# Patient Record
Sex: MALE | Race: WHITE
[De-identification: names, ages, dates, MRNs, and addresses within clinical notes are randomized per-mention and may not be internally consistent; named-entity substitution may affect disease eponyms.]

---

## 2020-04-02 ENCOUNTER — HOSPITAL ENCOUNTER (EMERGENCY)
Dept: HOSPITAL 56 - MW.ED | Age: 25
Discharge: HOME | End: 2020-04-02
Payer: SELF-PAY

## 2020-04-02 DIAGNOSIS — S05.01XA: Primary | ICD-10-CM

## 2020-04-02 DIAGNOSIS — H10.31: ICD-10-CM
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
## 2020-04-02 PROCEDURE — 99283 EMERGENCY DEPT VISIT LOW MDM: CPT

## 2020-04-02 NOTE — EDM.PDOC
ED HPI GENERAL MEDICAL PROBLEM





- General


Chief Complaint: Eye Problems


Stated Complaint: IRRITATION IN EYE


Time Seen by Provider: 04/02/20 10:04


Source of Information: Reports: Patient


History Limitations: Reports: No Limitations





- History of Present Illness


INITIAL COMMENTS - FREE TEXT/NARRATIVE: 


HISTORY AND PHYSICAL:





History of present illness:


Patient is a 24-year-old male who presents to the emergency room with 

complaints of irritation of his right thigh.  He states he noticed some 

irritation on Friday which has progressively gotten worse.  He states over the 

past 2 days he has had some drainage and crusty discharge from the eye.  He has 

tried some over-the-counter Benadryl which he reports helped with some of the 

swelling around the eye but returned the next day.  He denies any contact lens 

usage.  He denies any visual change or ocular pain with movement.  He denies 

any possible exposure to foreign body such as welding debris.  Patient denies 

any fever, chills, headache, change in vision, syncope or near syncope. Denies 

any chest pain, back pain, shortness of breath or cough. Denies any GI or  

symptoms.





Review of systems: 


As per history of present illness and below otherwise all systems reviewed and 

negative.





Past medical history: 


As per history of present illness and as reviewed below otherwise 

noncontributory.





Surgical history: 


As per history of present illness and as reviewed below otherwise 

noncontributory.





Social history: 


See social history for further information





Family history: 


As per history of present illness and as reviewed below otherwise 

noncontributory.





Physical exam:


General: Well-developed and well-nourished 24-year-old male.  Alert and 

oriented.  Nontoxic-appearing and in no acute distress.


HEENT: Atraumatic, normocephalic, pupils equal and reactive bilaterally, 

negative for conjunctival pallor or scleral icterus, sclera injection of the 

right, pain crusty drainage noted along the lower lash line of the right, 

mucous membranes moist, TMs normal bilaterally, throat clear, neck supple, 

nontender, trachea midline. No drooling or trismus noted. No meningeal signs. 

No hot potato voice noted. 


Lungs: Clear to auscultation, breath sounds equal bilaterally.


Heart: S1S2, regular rate and rhythm without overt murmur


Abdomen: Soft, nondistended, nontender. 


Skin: Intact, warm, dry. No lesions or rashes noted.


Extremities: Atraumatic, moves all extremities per self without difficulty or 

deficits. Neurovascular unremarkable.


Neuro: Awake, alert, oriented. Cranial nerves II through XII unremarkable. 

Cerebellum unremarkable. Motor and sensory unremarkable throughout. Exam 

nonfocal.





Notes:


Visual acuity is within normal limits.  Tetracaine and fluorescein eye exam was 

completed.  He does have a corneal abrasion at the 8 to 10 o'clock position on 

the sclera.  No foreign body noted.  Erythromycin ointment was provided with 

education while here.  We discussed the need to follow-up with ophthalmology if 

symptoms do not improve or worsen. Medication and supportive care measures were 

reviewed and discussed. Voices understanding and is agreeable to plan of care. 

Denies any further questions or concerns at this time.





Diagnostics:


Eye exam





Therapeutics:


Tetracaine, Erythromycin ointment





Prescription:


Erythromycin ointment





Impression: 


Conjunctivitis, right


Corneal Abrasion, right





Plan:


1. Good handwashing; avoid rubbing your eyes or touching the eye dropper in the 

eye.


2. Apply the ointment 5 x daily or every 4 hours while awake.


3. As we discussed, follow up with ophthalmology.


4. Return to the ED as needed as discussed. 





Definitive disposition and diagnosis as appropriate pending reevaluation and 

review of above.





  ** Right Eye


Pain Score (Numeric/FACES): 6





- Related Data


 Allergies











Allergy/AdvReac Type Severity Reaction Status Date / Time


 


amoxicillin Allergy  Swelling Verified 04/02/20 10:13


 


Penicillins Allergy  Swelling Verified 04/02/20 10:13











Home Meds: 


 Home Meds





Erythromycin Base [Erythromycin 0.5% Ophth Oint] 1 applic OP Q4H #1 tube 04/02/ 20 [Rx]











ED ROS GENERAL





- Review of Systems


Review Of Systems: Comprehensive ROS is negative, except as noted in HPI.





ED EXAM GENERAL W FULL EYE





- Physical Exam


Exam: See Below (See dictation)





Course





- Vital Signs


Last Recorded V/S: 


 Last Vital Signs











Temp  97.2 F   04/02/20 10:15


 


Pulse  88   04/02/20 10:45


 


Resp  18   04/02/20 10:45


 


BP  144/83 H  04/02/20 10:45


 


Pulse Ox  98   04/02/20 10:45














- Orders/Labs/Meds


Meds: 


Medications














Discontinued Medications














Generic Name Dose Route Start Last Admin





  Trade Name Freq  PRN Reason Stop Dose Admin


 


Erythromycin  1 gm  04/02/20 10:25  04/02/20 10:44





  Erythromycin 0.5% Ophth Oint  EYERT  04/02/20 10:26  1 gm





  ONETIME ONE   Administration





     





     





     





     


 


Tetracaine HCl  1 ml  04/02/20 10:07  04/02/20 10:44





  Tetracaine 0.5% Steri-Unit Sol  EYEBOTH  04/02/20 10:08  2 drop





  ASDIRECTED ONE   Administration





     





     





     





     














Departure





- Departure


Time of Disposition: 10:28


Disposition: Home, Self-Care 01


Clinical Impression: 


Conjunctivitis


Qualifiers:


 Conjunctivitis type: acute Acute conjunctivitis type: bacterial Laterality: 

right Qualified Code(s): H10.31 - Unspecified acute conjunctivitis, right eye





Corneal abrasion


Qualifiers:


 Encounter type: initial encounter Laterality: right Qualified Code(s): 

S05.01XA - Injury of conjunctiva and corneal abrasion without foreign body, 

right eye, initial encounter








- Discharge Information


Prescriptions: 


Erythromycin Base [Erythromycin 0.5% Ophth Oint] 1 applic OP Q4H #1 tube


Instructions:  Bacterial Conjunctivitis, Adult, Easy-to-Read


Referrals: 


PCP,None [Primary Care Provider] - 


Forms:  ED Department Discharge


Additional Instructions: 


The following information is given to patients seen in the emergency department 

who are being discharged to home. This information is to outline your options 

for follow-up care. We provide all patients seen in our emergency department 

with a follow-up referral.





The need for follow-up, as well as the timing and circumstances, are variable 

depending upon the specifics of your emergency department visit.





If you don't have a primary care physician on staff, we will provide you with a 

referral. We always advise you to contact your personal physician following an 

emergency department visit to inform them of the circumstance of the visit and 

for follow-up with them and/or the need for any referrals to a consulting 

specialist.





The emergency department will also refer you to a specialist when appropriate. 

This referral assures that you have the opportunity for follow-up care with a 

specialist. All of these measure are taken in an effort to provide you with 

optimal care, which includes your follow-up.





Under all circumstances we always encourage you to contact your private 

physician who remains a resource for coordinating your care. When calling for 

follow-up care, please make the office aware that this follow-up is from your 

recent emergency room visit. If for any reason you are refused follow-up, 

please contact the [facility] Emergency 

Department at [phone] and asked to speak to the emergency department 

charge nurse.





[facility]


Primary Care


[address] [place] [address]


Phone: [phone]


Fax: [phone]





[address] [address]


Phone: [phone]


Fax: [phone]





1. Good handwashing; avoid rubbing your eyes or touching the eye dropper in the 

eye.


2. Apply the ointment 5 x daily or every 4 hours while awake.


3. As we discussed, follow up with ophthalmology.


4. Return to the ED as needed as discussed. 





Sepsis Event Note





- Focused Exam


Vital Signs: 


 Vital Signs











  Temp Pulse Resp BP Pulse Ox


 


 04/02/20 10:45   88  18  144/83 H  98


 


 04/02/20 10:15  97.2 F  64  16  143/73 H  99











Date Exam was Performed: 04/02/20


Time Exam was Performed: 10:47